# Patient Record
Sex: MALE | Race: WHITE
[De-identification: names, ages, dates, MRNs, and addresses within clinical notes are randomized per-mention and may not be internally consistent; named-entity substitution may affect disease eponyms.]

---

## 2020-01-21 ENCOUNTER — HOSPITAL ENCOUNTER (EMERGENCY)
Dept: HOSPITAL 76 - ED | Age: 25
LOS: 1 days | Discharge: HOME | End: 2020-01-22
Payer: COMMERCIAL

## 2020-01-21 DIAGNOSIS — L05.01: Primary | ICD-10-CM

## 2020-01-21 PROCEDURE — 99283 EMERGENCY DEPT VISIT LOW MDM: CPT

## 2020-01-21 PROCEDURE — 10080 I&D PILONIDAL CYST SIMPLE: CPT

## 2020-01-21 PROCEDURE — 96372 THER/PROPH/DIAG INJ SC/IM: CPT

## 2020-01-21 PROCEDURE — 99284 EMERGENCY DEPT VISIT MOD MDM: CPT

## 2020-01-22 VITALS — SYSTOLIC BLOOD PRESSURE: 155 MMHG | DIASTOLIC BLOOD PRESSURE: 82 MMHG

## 2020-01-22 NOTE — ED PHYSICIAN DOCUMENTATION
PD HPI SKIN





- Stated complaint


Stated Complaint: BUMP ON TRUNK





- Chief complaint


Chief Complaint: Wound





- History obtained from


History obtained from: Patient





- History of Present Illness


Timing - onset: How many days ago (several)


Timing - duration: Days


Timing - details: Gradual onset, Still present (has noted some tenderness, 

swelling, mild redness in tailbone area c/w pilonidal cyst he had in the past. 

Had I&D of it in the past. Pain worse today.)


Location: Other (tailbone area)


Associated symptoms: No: Fever, Myalgias, N/V/D


Similar symptoms before: Diagnosis (pilonidal cyst with infection)


Recently seen: Not recently seen





Review of Systems


Constitutional: denies: Fever, Chills


GI: denies: Abdominal Pain, Nausea, Vomiting, Constipation, Diarrhea





PD PAST MEDICAL HISTORY





- Past Medical History


Cardiovascular: None


Respiratory: None


Neuro: None


Endocrine/Autoimmune: None





- Present Medications


Home Medications: 


                                Ambulatory Orders











 Medication  Instructions  Recorded  Confirmed


 


Naproxen 500 mg PO BID #20 tablet 01/22/20 


 


Oxycodone HCl/Acetaminophen 1 - 2 each PO Q6H PRN #14 tablet 01/22/20 





[Percocet 5-325 mg Tablet]   


 


Sulfamethox/Trimeth 800/160 1 each PO BID #14 tablet 01/22/20 





[Bactrim Ds 800/160]   














- Allergies


Allergies/Adverse Reactions: 


                                    Allergies











Allergy/AdvReac Type Severity Reaction Status Date / Time


 


No Known Drug Allergies Allergy   Verified 01/22/20 00:07














PD ED PE NORMAL





- Vitals


Vital signs reviewed: Yes





- General


General: Alert and oriented X 3, Well developed/nourished, Other (appears 

uncomfortable due to tailbone pain. )





- Abdomen


Abdomen: Soft, Non tender





- Back


Back: Other (he is tender at coccygeal area with mild swelling, redness and 

marked tenderness right of midline just at upper gluteal. No perirectal swelling

nor tender. Bedside U/S by me showed local swelling with minimal area of fluid 

localized there (1-2 mm). )





- Derm


Derm: Warm and dry.  No: Normal color (redness of skin locally at tender area. )





Results





- Vitals


Vitals: 


                               Vital Signs - 24 hr











  01/22/20 01/22/20 01/22/20





  00:04 00:16 00:20


 


Temperature 36.6 C  


 


Heart Rate 78  


 


Respiratory 18 16 17





Rate   


 


Blood Pressure 178/83 H  


 


O2 Saturation 99  














  01/22/20





  01:43


 


Temperature 


 


Heart Rate 65


 


Respiratory 18





Rate 


 


Blood Pressure 155/82 H


 


O2 Saturation 98








                                     Oxygen











O2 Source                      Room air

















PD MEDICAL DECISION MAKING





- ED course


Complexity details: considered differential (at this time, appears local 

swelling with some cellulitic changes but no fluid collection (1-2 mm by bedside

U/S). No cause for I&D at this time. ), d/w patient





Departure





- Departure


Disposition: 01 Home, Self Care


Clinical Impression: 


 Pilonidal cyst with abscess





Condition: Stable


Record reviewed to determine appropriate education?: Yes


Instructions:  ED Cyst Pilonidal Infec Abx Only


Follow-Up: 


CE GREGORY ARNP [Primary Care Provider] - 


MUKUL Gutierrez MD [Provider Admit Priv/Credential] - 


Prescriptions: 


Naproxen 500 mg PO BID #20 tablet


Oxycodone HCl/Acetaminophen [Percocet 5-325 mg Tablet] 1 - 2 each PO Q6H PRN #14

tablet


 PRN Reason: pain


Sulfamethox/Trimeth 800/160 [Bactrim Ds 800/160] 1 each PO BID #14 tablet


Comments: 


Warm moist towels or soaking foot the area to promote good blood flow and help 

fight off the infection.





Anti-inflammatory such as naproxen twice daily.  To that add Tylenol or 

oxycodone as needed for pain.





Bactrim antibiotic twice daily for a week.





Right now I see inflammation in the area without any localized pocket of fluid 

on the bedside ultrasound.  It may develop some localized fluid that needs 

draining but at this point I think we can treated adequately with oral 

antibiotics anti-inflammatories and pain medicine.





Recheck if not improving well over the next couple of days and return if 

worsening.





Subsequently can follow-up with general surgery regarding whether to have 

excision of the cyst and scar area to reduce the chance of repeated infections.


Forms:  Activity restrictions


Discharge Date/Time: 01/22/20 01:53

## 2021-03-02 ENCOUNTER — HOSPITAL ENCOUNTER (OUTPATIENT)
Dept: HOSPITAL 76 - DI | Age: 26
Discharge: HOME | End: 2021-03-02
Attending: NURSE PRACTITIONER
Payer: MEDICAID

## 2021-03-02 DIAGNOSIS — S43.421A: Primary | ICD-10-CM

## 2021-03-02 PROCEDURE — 73222 MRI JOINT UPR EXTREM W/DYE: CPT

## 2021-03-02 PROCEDURE — 77002 NEEDLE LOCALIZATION BY XRAY: CPT

## 2021-03-02 PROCEDURE — 23350 INJECTION FOR SHOULDER X-RAY: CPT

## 2021-03-02 NOTE — MRI REPORT
PROCEDURE:  Arthrogram Shoulder RT

 

INDICATIONS:  RIGHT ROTATOR CUFF SPRAIN

 

CONTRAST: 12 mL of diluted intra-articular gadolinium contrast.

 

TECHNIQUE:  

After the administration of 12 mL of dilute intra-articular Gadolinium contrast, oblique coronal T1 a
nd T2 spin echo with fat saturation, oblique sagittal T1 spin echo with and without fat saturation, o
blique sagittal T2 fast spin echo with fat saturation, axial T1 spin echo with fat saturation through
 the shoulder.  

 

COMPARISON:  None.

 

FINDINGS:  

Image quality:  Excellent.  

 

Rotator cuff: Distal supraspinatus tendinosis at its insertion on humeral head is seen. Very low-grad
e articular surface partial-thickness tear cannot be excluded. Distal infraspinatus and subscapularis
 tendons are grossly intact. No rotator cuff muscle atrophy on sagittal images.  

 

Bones and bursae:  No bone marrow contusions or fractures. Very mild acromioclavicular joint osteoart
hritic changes are seen with small downward osteophyte formation depressing on musculotendinous junct
ion of supraspinatus.  The acromion demonstrates conventional anatomy, without an os acromiale.  

 

Capsule and soft tissues:  The labrum and glenohumeral ligaments appear intact.  The long head of the
 biceps tendon demonstrates normal location and morphology.  The rotator interval appears normal, wit
hout fibrosis.  The coracohumeral ligament is of normal thickness.  No intra-articular bodies.  

 

IMPRESSION:  

 

1. Tendinosis and possible very low-grade articular surface partial-thickness tear involving distal s
upraspinatus. No full-thickness rotator cuff tendon rupture.

2. Very mild acromioclavicular joint osteophytic changes.

3. No evidence of focal labral tear.

 

Reviewed by: Mark Pisano MD on 3/2/2021 5:25 PM PST

Approved by: Mark Pisano MD on 3/2/2021 5:25 PM PST

 

 

Station ID:  [id number]

## 2021-03-02 NOTE — XRAY REPORT
TECHNIQUE: PROCEDURE:  Arthrogram Needle Placement

 

INDICATIONS:  RIGHT ROTATOR CUFF SPRAIN

 

CONTRAST: CONTRAST: gadavist    

 

FLUOROSCOPY TIME:  FLUORO TIME: 0.3min and NUMBER IMAGES: 3

 

TECHNIQUE:  

The indications, alternatives, benefits, risks, and complications of the procedure were explained to 
the patient.  Written informed consent was obtained and placed in the chart.  The shoulder was examin
ed fluoroscopically and a site for needle placement chosen for entry into the glenohumeral joint from
 an anterior approach.  The skin was prepped and draped in the usual fashion, and 1% lidocaine infilt
rated from skin down to joint capsule.  A spinal needle was inserted into the glenohumeral joint, and
 a small amount of iodinated contrast media injected to confirm intra-articular placement of the need
le tip.  This was followed by approximately 12 mL dilute solution of a gadolinium containing MR contr
ast agent.  

 

The needle was removed and a dressing was applied.  The patient was given postprocedural instructions
 and sent to the MR suite for MR imaging.  

 

FINDINGS:  

A single fluoroscopic spot image demonstrates intra-articular location of injected iodinated contrast
.

 

IMPRESSION:  

Successful fluoroscopically guided administration of dilute Gadolinium solution into the right should
er joint for MR arthrogram.  

 

Reviewed by: Jason Grissom MD on 3/2/2021 2:59 PM PST

Approved by: Jason Grissom MD on 3/2/2021 2:59 PM PST

 

 

Station ID:  [id number]